# Patient Record
Sex: MALE | Race: WHITE | NOT HISPANIC OR LATINO | Employment: FULL TIME | URBAN - METROPOLITAN AREA
[De-identification: names, ages, dates, MRNs, and addresses within clinical notes are randomized per-mention and may not be internally consistent; named-entity substitution may affect disease eponyms.]

---

## 2023-04-26 ENCOUNTER — TELEPHONE (OUTPATIENT)
Dept: NEUROLOGY | Facility: CLINIC | Age: 53
End: 2023-04-26

## 2023-04-26 NOTE — TELEPHONE ENCOUNTER
----- Message from Shruti Chang sent at 4/26/2023 10:54 AM CDT -----  [facility] Dr calling to see if a referral was received 3-29 , was re faxed on 4-20 , please call       [phone] ext [phone]  Maira or Altagracia

## 2023-05-31 ENCOUNTER — TELEPHONE (OUTPATIENT)
Dept: NEUROLOGY | Facility: CLINIC | Age: 53
End: 2023-05-31

## 2023-10-13 ENCOUNTER — TELEPHONE (OUTPATIENT)
Dept: NEUROLOGY | Facility: CLINIC | Age: 53
End: 2023-10-13

## 2023-10-13 NOTE — TELEPHONE ENCOUNTER
----- Message from Darvin CHRISTOPHER Route sent at 8/18/2023  4:07 PM CDT -----  Regarding: Appointment  Contact: [phone]  Pt is calling in ref to scheduling an appt. He has a referral in the system. He has a Neuromuscular Disorder. He was a pt at [facility] his provider told him to find another provider. Already positively diagnosed with MG. Medication had been controlling his symptoms but now he is having a problem breathing and double vision. Patient Requesting Call Back @ [phone]

## 2023-10-27 ENCOUNTER — TELEPHONE (OUTPATIENT)
Dept: NEUROLOGY | Facility: CLINIC | Age: 53
End: 2023-10-27

## 2023-10-30 ENCOUNTER — TELEPHONE (OUTPATIENT)
Dept: NEUROLOGY | Facility: CLINIC | Age: 53
End: 2023-10-30

## 2024-01-01 NOTE — TELEPHONE ENCOUNTER
Left a message for the doctors office to provide fax and phone number.   Imaging Studies/Medications